# Patient Record
Sex: MALE | Race: WHITE | NOT HISPANIC OR LATINO | Employment: FULL TIME | URBAN - METROPOLITAN AREA
[De-identification: names, ages, dates, MRNs, and addresses within clinical notes are randomized per-mention and may not be internally consistent; named-entity substitution may affect disease eponyms.]

---

## 2017-11-03 ENCOUNTER — OFFICE VISIT (OUTPATIENT)
Dept: RETAIL CLINIC | Facility: CLINIC | Age: 47
End: 2017-11-03

## 2017-11-03 VITALS
OXYGEN SATURATION: 98 % | SYSTOLIC BLOOD PRESSURE: 128 MMHG | TEMPERATURE: 97.1 F | RESPIRATION RATE: 20 BRPM | DIASTOLIC BLOOD PRESSURE: 74 MMHG | HEART RATE: 74 BPM

## 2017-11-03 DIAGNOSIS — H66.003 ACUTE SUPPURATIVE OTITIS MEDIA OF BOTH EARS WITHOUT SPONTANEOUS RUPTURE OF TYMPANIC MEMBRANES, RECURRENCE NOT SPECIFIED: ICD-10-CM

## 2017-11-03 DIAGNOSIS — J01.00 ACUTE MAXILLARY SINUSITIS, RECURRENCE NOT SPECIFIED: Primary | ICD-10-CM

## 2017-11-03 PROCEDURE — 99213 OFFICE O/P EST LOW 20 MIN: CPT | Performed by: NURSE PRACTITIONER

## 2017-11-03 RX ORDER — FEXOFENADINE HCL 180 MG/1
180 TABLET ORAL DAILY
COMMUNITY

## 2017-11-03 RX ORDER — AZITHROMYCIN 250 MG/1
TABLET, FILM COATED ORAL
Qty: 6 TABLET | Refills: 0 | Status: SHIPPED | OUTPATIENT
Start: 2017-11-03 | End: 2018-04-03

## 2017-11-03 RX ORDER — FLUTICASONE PROPIONATE 50 MCG
2 SPRAY, SUSPENSION (ML) NASAL DAILY
Qty: 1 BOTTLE | Refills: 0 | Status: SHIPPED | OUTPATIENT
Start: 2017-11-03 | End: 2017-11-10

## 2017-11-03 NOTE — PROGRESS NOTES
Subjective   Andriy Allison is a 47 y.o. male.     Sinusitis   This is a new problem. The problem is unchanged. There has been no fever. Associated symptoms include ear pain, headaches and sinus pressure. Pertinent negatives include no chills, shortness of breath or sore throat. Treatments tried: sudafed. The treatment provided mild relief.        The following portions of the patient's history were reviewed and updated as appropriate: allergies, current medications, past family history, past medical history, past social history, past surgical history and problem list.    Review of Systems   Constitutional: Negative.  Negative for chills and fever.   HENT: Positive for ear pain and sinus pressure. Negative for sore throat.    Eyes: Negative.    Respiratory: Negative.  Negative for shortness of breath.    Cardiovascular: Negative.    Gastrointestinal: Negative.    Endocrine: Negative.    Genitourinary: Negative.    Musculoskeletal: Negative.    Skin: Negative.    Allergic/Immunologic: Negative.    Neurological: Positive for headaches.   Hematological: Negative.    Psychiatric/Behavioral: Negative.        Objective   Physical Exam   Constitutional: He is oriented to person, place, and time. Vital signs are normal. He appears well-developed and well-nourished.   HENT:   Head: Normocephalic and atraumatic.   Right Ear: Hearing, external ear and ear canal normal. Tympanic membrane is erythematous.   Left Ear: Hearing, external ear and ear canal normal. Tympanic membrane is erythematous.   Nose: Right sinus exhibits maxillary sinus tenderness and frontal sinus tenderness. Left sinus exhibits maxillary sinus tenderness and frontal sinus tenderness.   Mouth/Throat: Uvula is midline and mucous membranes are normal. Posterior oropharyngeal erythema present. No tonsillar exudate.   Eyes: Conjunctivae and lids are normal. Pupils are equal, round, and reactive to light.   Neck: Trachea normal and normal range of motion. Neck  supple.   Cardiovascular: Normal rate, regular rhythm, S1 normal, S2 normal and normal heart sounds.    Pulmonary/Chest: Effort normal and breath sounds normal. No respiratory distress.   Abdominal: Soft. Normal appearance and bowel sounds are normal. There is no tenderness.   Musculoskeletal: Normal range of motion.   Lymphadenopathy:        Head (right side): Posterior auricular adenopathy present.        Head (left side): No posterior auricular adenopathy present.     He has no cervical adenopathy.   Small post auricular lymph node    Neurological: He is alert and oriented to person, place, and time. He has normal strength.   Skin: Skin is warm, dry and intact. No rash noted.   Psychiatric: He has a normal mood and affect. His speech is normal and behavior is normal.   Vitals reviewed.      Assessment/Plan   Problems Addressed this Visit     None      Visit Diagnoses     Acute maxillary sinusitis, recurrence not specified    -  Primary    Acute suppurative otitis media of both ears without spontaneous rupture of tympanic membranes, recurrence not specified            Blood glucose 150 this am  Zpack as directed   Flonase 2 sprays in each nostril once a day  Check blood sugar 2 times a day

## 2018-04-03 ENCOUNTER — OFFICE VISIT (OUTPATIENT)
Dept: RETAIL CLINIC | Facility: CLINIC | Age: 48
End: 2018-04-03

## 2018-04-03 VITALS
DIASTOLIC BLOOD PRESSURE: 84 MMHG | SYSTOLIC BLOOD PRESSURE: 122 MMHG | HEART RATE: 90 BPM | TEMPERATURE: 98.5 F | OXYGEN SATURATION: 97 %

## 2018-04-03 DIAGNOSIS — H66.002 ACUTE SUPPURATIVE OTITIS MEDIA OF LEFT EAR WITHOUT SPONTANEOUS RUPTURE OF TYMPANIC MEMBRANE, RECURRENCE NOT SPECIFIED: Primary | ICD-10-CM

## 2018-04-03 DIAGNOSIS — R05.9 COUGH: ICD-10-CM

## 2018-04-03 PROCEDURE — 99213 OFFICE O/P EST LOW 20 MIN: CPT | Performed by: NURSE PRACTITIONER

## 2018-04-03 RX ORDER — SULFAMETHOXAZOLE AND TRIMETHOPRIM 800; 160 MG/1; MG/1
1 TABLET ORAL 2 TIMES DAILY
Qty: 20 TABLET | Refills: 0 | Status: SHIPPED | OUTPATIENT
Start: 2018-04-03 | End: 2018-04-13

## 2018-04-03 RX ORDER — BENZONATATE 100 MG/1
CAPSULE ORAL
Qty: 30 CAPSULE | Refills: 0 | Status: SHIPPED | OUTPATIENT
Start: 2018-04-03

## 2018-04-03 NOTE — PROGRESS NOTES
Subjective     Andriy Allison is a 48 y.o.. male.     Sinusitis   This is a new problem. Episode onset: 2 days ago. The problem is unchanged. There has been no fever. Associated symptoms include congestion, coughing (productive), ear pain, headaches and a sore throat. Treatments tried: cough med, tylenol, sudafed. The treatment provided mild relief.       The following portions of the patient's history were reviewed and updated as appropriate: allergies, current medications, past family history, past medical history, past social history, past surgical history and problem list.    Review of Systems   Constitutional: Negative for fever.   HENT: Positive for congestion, ear pain, rhinorrhea and sore throat.    Respiratory: Positive for cough (productive).    Gastrointestinal: Negative for abdominal pain, diarrhea, nausea and vomiting.   Neurological: Positive for headaches.       Objective     Vitals:    04/03/18 1638   BP: 122/84   Pulse: 90   Temp: 98.5 °F (36.9 °C)   TempSrc: Oral   SpO2: 97%       Physical Exam   Constitutional: He is oriented to person, place, and time. He appears well-developed and well-nourished.   HENT:   Head: Normocephalic and atraumatic.   Right Ear: Tympanic membrane normal. Tympanic membrane is not erythematous.   Left Ear: Tympanic membrane is erythematous. A middle ear effusion is present.   Nose: Mucosal edema present. Right sinus exhibits no maxillary sinus tenderness and no frontal sinus tenderness. Left sinus exhibits no maxillary sinus tenderness and no frontal sinus tenderness.   Mouth/Throat: Oropharynx is clear and moist. Oropharyngeal exudate: pnd.   Eyes: Conjunctivae are normal. Pupils are equal, round, and reactive to light.   Cardiovascular: Normal rate and regular rhythm.    No murmur heard.  Pulmonary/Chest: Effort normal. He has no wheezes. He has no rhonchi. He has no rales.   Musculoskeletal: Normal range of motion.   Lymphadenopathy:     He has no cervical adenopathy.    Neurological: He is alert and oriented to person, place, and time.   Skin: Skin is warm and dry.   Vitals reviewed.      Assessment/Plan   Andriy was seen today for sinusitis.    Diagnoses and all orders for this visit:    Acute suppurative otitis media of left ear without spontaneous rupture of tympanic membrane, recurrence not specified  -     sulfamethoxazole-trimethoprim (BACTRIM DS) 800-160 MG per tablet; Take 1 tablet by mouth 2 (Two) Times a Day for 10 days.    Cough  -     benzonatate (TESSALON PERLES) 100 MG capsule; 1 cap po 3 times a day as needed for cough        Patient Instructions   Otitis Media, Adult  Otitis media occurs when there is inflammation and fluid in the middle ear. Your middle ear is a part of the ear that contains bones for hearing as well as air that helps send sounds to your brain.  What are the causes?  This condition is caused by a blockage in the eustachian tube. This tube drains fluid from the ear to the back of the nose (nasopharynx). A blockage in this tube can be caused by an object or by swelling (edema) in the tube. Problems that can cause a blockage include:  · A cold or other upper respiratory infection.  · Allergies.  · An irritant, such as tobacco smoke.  · Enlarged adenoids. The adenoids are areas of soft tissue located high in the back of the throat, behind the nose and the roof of the mouth.  · A mass in the nasopharynx.  · Damage to the ear caused by pressure changes (barotrauma).  What are the signs or symptoms?  Symptoms of this condition include:  · Ear pain.  · A fever.  · Decreased hearing.  · A headache.  · Tiredness (lethargy).  · Fluid leaking from the ear.  · Ringing in the ear.  How is this diagnosed?  This condition is diagnosed with a physical exam. During the exam your health care provider will use an instrument called an otoscope to look into your ear and check for redness, swelling, and fluid. He or she will also ask about your symptoms.  Your health  care provider may also order tests, such as:  · A test to check the movement of the eardrum (pneumatic otoscopy). This test is done by squeezing a small amount of air into the ear.  · A test that changes air pressure in the middle ear to check how well the eardrum moves and whether the eustachian tube is working (tympanogram).  How is this treated?  This condition usually goes away on its own within 3-5 days. But if the condition is caused by a bacteria infection and does not go away own its own, or keeps coming back, your health care provider may:  · Prescribe antibiotic medicines to treat the infection.  · Prescribe or recommend medicines to control pain.  Follow these instructions at home:  · Take over-the-counter and prescription medicines only as told by your health care provider.  · If you were prescribed an antibiotic medicine, take it as told by your health care provider. Do not stop taking the antibiotic even if you start to feel better.  · Keep all follow-up visits as told by your health care provider. This is important.  Contact a health care provider if:  · You have bleeding from your nose.  · There is a lump on your neck.  · You are not getting better in 5 days.  · You feel worse instead of better.  Get help right away if:  · You have severe pain that is not controlled with medicine.  · You have swelling, redness, or pain around your ear.  · You have stiffness in your neck.  · A part of your face is paralyzed.  · The bone behind your ear (mastoid) is tender when you touch it.  · You develop a severe headache.  Summary  · Otitis media is redness, soreness, and swelling of the middle ear.  · This condition usually goes away on its own within 3-5 days.  · If the problem does not go away in 3-5 days, your health care provider may prescribe or recommend medicines to treat your symptoms.  · If you were prescribed an antibiotic medicine, take it as told by your health care provider.  This information is not  intended to replace advice given to you by your health care provider. Make sure you discuss any questions you have with your health care provider.  Document Released: 09/22/2005 Document Revised: 12/08/2017 Document Reviewed: 12/08/2017  [employer] Interactive Patient Education © 2017 [employer]        Return if symptoms worsen or fail to improve with urgent care/ER.

## 2018-04-03 NOTE — PATIENT INSTRUCTIONS

## 2018-06-07 ENCOUNTER — OFFICE VISIT (OUTPATIENT)
Dept: GASTROENTEROLOGY | Facility: CLINIC | Age: 48
End: 2018-06-07

## 2018-06-07 VITALS
WEIGHT: 285 LBS | SYSTOLIC BLOOD PRESSURE: 134 MMHG | HEIGHT: 70 IN | TEMPERATURE: 97.9 F | BODY MASS INDEX: 40.8 KG/M2 | DIASTOLIC BLOOD PRESSURE: 82 MMHG

## 2018-06-07 DIAGNOSIS — R93.2 ABNORMAL CT SCAN, LIVER: Primary | ICD-10-CM

## 2018-06-07 PROCEDURE — 99203 OFFICE O/P NEW LOW 30 MIN: CPT | Performed by: INTERNAL MEDICINE

## 2018-06-07 NOTE — PROGRESS NOTES
Chief Complaint   Patient presents with   • Abdominal Pain   • Cirrhosis     Andriy Allison is a 48 y.o. male who presents with a History of left sided chest pain and abnormal CAT scan   HPI     Patient 48-year-old male with history diabetes and GERD as well as hyperlipidemia complaining of left sided pain.  Patient initially felt in the left flank now moved laterally to the left chest wall.  Patient reports pain increased with breathing.  Patient apparently was diagnosed with possible pancreatitis and diabetic medicine was changed.  Patient apparently underwent CT would suggest spleen enlargement and possible changes in the liver for early cirrhosis.  Actual results are not available of these exams but patient did see Dr. Rucker who reported mild enlargement of the spleen with enlargement of the liver that actually increased between February and May from 18 cm to 24 cm.  Liver function tests were all normal as was platelet count.  Patient was to be considered for splenectomy but this was held due to the finding of possible cirrhosis.  Patient here to evaluate liver.  Patient denies family history of liver disease and denies ever being drinker.    Past Medical History:   Diagnosis Date   • Diabetes mellitus    • GERD (gastroesophageal reflux disease)    • Hyperlipidemia    • Pancreatitis    • Prostate disease        Current Outpatient Prescriptions:   •  ALLOPURINOL PO, Take  by mouth., Disp: , Rfl:   •  fexofenadine (ALLEGRA) 180 MG tablet, Take 180 mg by mouth Daily., Disp: , Rfl:   •  insulin detemir (LEVEMIR) 100 UNIT/ML injection, Inject 30 Units under the skin Daily., Disp: , Rfl:   •  Insulin Lispro, Human, (HUMALOG KWIKPEN SC), Inject  under the skin., Disp: , Rfl:   •  Semaglutide 0.25 or 0.5 MG/DOSE solution pen-injector, Inject  under the skin., Disp: , Rfl:   •  tamsulosin (FLOMAX) 0.4 MG capsule 24 hr capsule, Take 1 capsule by mouth every night., Disp: , Rfl:   •  benzonatate (TESSALON PERLES) 100 MG  capsule, 1 cap po 3 times a day as needed for cough, Disp: 30 capsule, Rfl: 0  •  Dulaglutide (TRULICITY) 1.5 MG/0.5ML solution pen-injector, Inject 1.5 mg under the skin 1 (One) Time Per Week., Disp: , Rfl:   •  Lovastatin (MEVACOR PO), Take  by mouth., Disp: , Rfl:   •  omeprazole (PriLOSEC) 40 MG capsule, Take 40 mg by mouth daily., Disp: , Rfl:     Current Facility-Administered Medications:   •  triamcinolone acetonide (KENALOG-40) injection 40 mg, 40 mg, Intramuscular, Once, Vijay Mehta MD  Allergies   Allergen Reactions   • Januvia [Sitagliptin] Other (See Comments)     pancreatitis   • Keflex [Cephalexin] Anaphylaxis   • Penicillins Anaphylaxis     Social History     Social History   • Marital status:      Spouse name: N/A   • Number of children: N/A   • Years of education: N/A     Occupational History   • Not on file.     Social History Main Topics   • Smoking status: Never Smoker   • Smokeless tobacco: Never Used   • Alcohol use No   • Drug use: No   • Sexual activity: Not on file     Other Topics Concern   • Not on file     Social History Narrative   • No narrative on file     Family History   Problem Relation Age of Onset   • Cancer Mother    • Diabetes Mother    • Diverticulitis Mother    • Hypertension Sister    • Hyperlipidemia Sister    • Irritable bowel syndrome Sister    • Diabetes Maternal Grandmother    • Colon cancer Maternal Grandmother    • Hypertension Maternal Grandfather    • Hypertension Paternal Grandmother    • Colon cancer Maternal Uncle      Review of Systems   Constitutional: Negative.    HENT: Negative.    Eyes: Negative.    Respiratory: Negative.    Cardiovascular: Positive for chest pain. Negative for palpitations and leg swelling.   Gastrointestinal: Negative.    Endocrine: Negative.    Musculoskeletal: Negative.    Skin: Negative.    Allergic/Immunologic: Negative.    Hematological: Negative.      Vitals:    06/07/18 0859   BP: 134/82   Temp: 97.9 °F (36.6 °C)      Physical Exam   Constitutional: He is oriented to person, place, and time. He appears well-developed and well-nourished.   HENT:   Head: Normocephalic and atraumatic.   Eyes: EOM are normal. Pupils are equal, round, and reactive to light. No scleral icterus.   Neck: Normal range of motion. No tracheal deviation present.   Cardiovascular: Normal rate and regular rhythm.  Exam reveals no gallop and no friction rub.    No murmur heard.  Pulmonary/Chest: Effort normal and breath sounds normal. No respiratory distress. He exhibits tenderness.   Positive chest wall tenderness over the left rib cage.  She reports palpation reproduces chest pain described   Abdominal: Soft. Bowel sounds are normal. He exhibits no distension and no mass. There is no tenderness. There is no rebound and no guarding.   Musculoskeletal: Normal range of motion. He exhibits no edema.   Neurological: He is alert and oriented to person, place, and time. No cranial nerve deficit.   Skin: Skin is warm and dry. No rash noted.   Psychiatric: He has a normal mood and affect. His behavior is normal.   Nursing note and vitals reviewed.    Diagnoses and all orders for this visit:    Abnormal CT scan, liver  -     Alpha - 1 - Antitrypsin  -     NERI  -     Anti-Smooth Muscle Antibody Titer  -     Comprehensive Metabolic Panel  -     Hepatitis A Antibody, Total  -     Hepatitis B Surface Antibody  -     Hepatitis B Surface Antigen  -     Hepatitis C Antibody  -     Iron Profile  -     Mitochondrial Antibodies, M2  -     NM Liver Spleen Scan; Future  -     Protime-INR  -     CBC (No Diff)    Other orders  -     Semaglutide 0.25 or 0.5 MG/DOSE solution pen-injector; Inject  under the skin.    Patient 48-year-old male with past medical history significant for diabetes, hyperlipidemia and GERD presenting with left upper quadrant pain.  Patient reports concern for possible pancreatitis led to changing his diabetic medicines.  Patient reports tried holding his  food was only eating liquids and protein shakes with no real change in his pain.  Patient now back on food with no change in the pain either.  Patient reports feels like runner stitch in his left rib cage.  Patient reports increased pain with breathing.  Physical exam with tenderness over the left rib cage.  Patient unaware of results of blood testing concerning pancreatitis.  Patient was found because of the left side pain on CT to have enlargement of the spleen as well as what sounds like nodularity of the liver for possible early cirrhosis.  Results specifically are not available but Dr. Rucker mentions enlarged liver from 18 cm to 24 cm between February and May exams.  While enlargement of the liver is not expected and cirrhosis may be seen in fatty liver particularly in the diabetic.  Liver tests that are available do not just cirrhosis.  At this point will send us her logic evaluation for liver disease as well as arrange liver spleen scan to evaluate for colloid shift.  Patient was never an alcohol drinker and doubt that this is actually cirrhosis causing this issue.  Review of normal LFTs and normal platelet count this may be just a consequence of fatty liver bleeding to systems clinic hypertrophy.  We'll follow up results clinically for further recommendations.

## 2018-06-08 LAB
A1AT SERPL-MCNC: 153 MG/DL (ref 90–200)
ACTIN IGG SERPL-ACNC: 12 UNITS (ref 0–19)
ALBUMIN SERPL-MCNC: 4.4 G/DL (ref 3.5–5.2)
ALBUMIN/GLOB SERPL: 1.1 G/DL
ALP SERPL-CCNC: 91 U/L (ref 39–117)
ALT SERPL-CCNC: 25 U/L (ref 1–41)
ANA SER QL: NEGATIVE
AST SERPL-CCNC: 27 U/L (ref 1–40)
BILIRUB SERPL-MCNC: 0.8 MG/DL (ref 0.1–1.2)
BUN SERPL-MCNC: 24 MG/DL (ref 6–20)
BUN/CREAT SERPL: 20 (ref 7–25)
CALCIUM SERPL-MCNC: 9.3 MG/DL (ref 8.6–10.5)
CHLORIDE SERPL-SCNC: 98 MMOL/L (ref 98–107)
CO2 SERPL-SCNC: 23.2 MMOL/L (ref 22–29)
CREAT SERPL-MCNC: 1.2 MG/DL (ref 0.76–1.27)
ERYTHROCYTE [DISTWIDTH] IN BLOOD BY AUTOMATED COUNT: 13.3 % (ref 11.5–14.5)
GFR SERPLBLD CREATININE-BSD FMLA CKD-EPI: 65 ML/MIN/1.73
GFR SERPLBLD CREATININE-BSD FMLA CKD-EPI: 79 ML/MIN/1.73
GLOBULIN SER CALC-MCNC: 4 GM/DL
GLUCOSE SERPL-MCNC: 183 MG/DL (ref 65–99)
HAV AB SER QL IA: NEGATIVE
HBV SURFACE AB SER QL: NON REACTIVE
HBV SURFACE AG SERPL QL IA: NEGATIVE
HCT VFR BLD AUTO: 36.5 % (ref 40.4–52.2)
HCV AB S/CO SERPL IA: 0.1 S/CO RATIO (ref 0–0.9)
HGB BLD-MCNC: 13.2 G/DL (ref 13.7–17.6)
INR PPP: 1.11 (ref 0.9–1.1)
IRON SATN MFR SERPL: 26 % (ref 20–50)
IRON SERPL-MCNC: 90 MCG/DL (ref 59–158)
MCH RBC QN AUTO: 31.7 PG (ref 27–32.7)
MCHC RBC AUTO-ENTMCNC: 36.2 G/DL (ref 32.6–36.4)
MCV RBC AUTO: 87.7 FL (ref 79.8–96.2)
MITOCHONDRIA M2 IGG SER-ACNC: <20 UNITS (ref 0–20)
PLATELET # BLD AUTO: 146 10*3/MM3 (ref 140–500)
POTASSIUM SERPL-SCNC: 4.1 MMOL/L (ref 3.5–5.2)
PROT SERPL-MCNC: 8.4 G/DL (ref 6–8.5)
PROTHROMBIN TIME: 14.1 SECONDS (ref 11.7–14.2)
RBC # BLD AUTO: 4.16 10*6/MM3 (ref 4.6–6)
SODIUM SERPL-SCNC: 135 MMOL/L (ref 136–145)
TIBC SERPL-MCNC: 351 MCG/DL
UIBC SERPL-MCNC: 261 MCG/DL
WBC # BLD AUTO: 7.28 10*3/MM3 (ref 4.5–10.7)

## 2018-06-13 ENCOUNTER — HOSPITAL ENCOUNTER (OUTPATIENT)
Dept: NUCLEAR MEDICINE | Facility: HOSPITAL | Age: 48
Discharge: HOME | End: 2018-06-13
Attending: INTERNAL MEDICINE

## 2018-06-13 DIAGNOSIS — R93.2 ABNORMAL CT SCAN, LIVER: ICD-10-CM

## 2018-06-13 PROCEDURE — 0 TECHNETIUM SULFUR COLLOID: Performed by: INTERNAL MEDICINE

## 2018-06-13 PROCEDURE — 78215 LVR&SPLEEN IMG STATIC ONLY: CPT

## 2018-06-13 PROCEDURE — A9541 TC99M SULFUR COLLOID: HCPCS | Performed by: INTERNAL MEDICINE

## 2018-06-13 RX ADMIN — TECHNETIUM TC 99M SULFUR COLLOID 1 DOSE: KIT at 09:54

## 2018-06-22 ENCOUNTER — TELEPHONE (OUTPATIENT)
Dept: GASTROENTEROLOGY | Facility: CLINIC | Age: 48
End: 2018-06-22

## 2018-07-24 ENCOUNTER — TELEPHONE (OUTPATIENT)
Dept: GASTROENTEROLOGY | Facility: CLINIC | Age: 48
End: 2018-07-24

## 2018-07-24 NOTE — TELEPHONE ENCOUNTER
----- Message from Samson Romo MD sent at 7/9/2018  4:28 PM EDT -----  LAbs all normal with normal liver spleen scan, no evidence for liver disease or cirrhosis

## 2018-07-24 NOTE — TELEPHONE ENCOUNTER
----- Message from Samson Romo MD sent at 7/6/2018  3:00 PM EDT -----  Skin the labs all normal consistent with normal liver function.  No evidence of liver dysfunction present.  Patient follow-up as needed.

## 2024-12-09 ENCOUNTER — HOSPITAL ENCOUNTER (EMERGENCY)
Facility: HOSPITAL | Age: 54
Discharge: HOME | End: 2024-12-09
Attending: EMERGENCY MEDICINE | Admitting: EMERGENCY MEDICINE
Payer: COMMERCIAL

## 2024-12-09 ENCOUNTER — APPOINTMENT (OUTPATIENT)
Dept: GENERAL RADIOLOGY | Facility: HOSPITAL | Age: 54
End: 2024-12-09
Payer: COMMERCIAL

## 2024-12-09 ENCOUNTER — APPOINTMENT (OUTPATIENT)
Dept: CT IMAGING | Facility: HOSPITAL | Age: 54
End: 2024-12-09
Payer: COMMERCIAL

## 2024-12-09 VITALS
TEMPERATURE: 97.9 F | DIASTOLIC BLOOD PRESSURE: 67 MMHG | SYSTOLIC BLOOD PRESSURE: 113 MMHG | RESPIRATION RATE: 20 BRPM | OXYGEN SATURATION: 96 % | HEIGHT: 70 IN | WEIGHT: 300 LBS | BODY MASS INDEX: 42.95 KG/M2 | HEART RATE: 80 BPM

## 2024-12-09 DIAGNOSIS — R07.9 CHEST PAIN, UNSPECIFIED TYPE: Primary | ICD-10-CM

## 2024-12-09 LAB
ALBUMIN SERPL-MCNC: 3.8 G/DL (ref 3.5–5.2)
ALBUMIN/GLOB SERPL: 1 G/DL
ALP SERPL-CCNC: 96 U/L (ref 39–117)
ALT SERPL W P-5'-P-CCNC: 23 U/L (ref 1–41)
ANION GAP SERPL CALCULATED.3IONS-SCNC: 7 MMOL/L (ref 5–15)
ANISOCYTOSIS BLD QL: ABNORMAL
AST SERPL-CCNC: 37 U/L (ref 1–40)
BASOPHILS # BLD MANUAL: 0 10*3/MM3 (ref 0–0.2)
BASOPHILS NFR BLD MANUAL: 0 % (ref 0–1.5)
BILIRUB SERPL-MCNC: 1.5 MG/DL (ref 0–1.2)
BUN SERPL-MCNC: 10 MG/DL (ref 6–20)
BUN/CREAT SERPL: 8.2 (ref 7–25)
CALCIUM SPEC-SCNC: 9 MG/DL (ref 8.6–10.5)
CHLORIDE SERPL-SCNC: 104 MMOL/L (ref 98–107)
CO2 SERPL-SCNC: 24 MMOL/L (ref 22–29)
CREAT SERPL-MCNC: 1.22 MG/DL (ref 0.76–1.27)
D DIMER PPP FEU-MCNC: 1.58 MCGFEU/ML (ref 0–0.52)
DEPRECATED RDW RBC AUTO: 44.3 FL (ref 37–54)
EGFRCR SERPLBLD CKD-EPI 2021: 71.3 ML/MIN/1.73
EOSINOPHIL # BLD MANUAL: 0.15 10*3/MM3 (ref 0–0.4)
EOSINOPHIL NFR BLD MANUAL: 3.1 % (ref 0.3–6.2)
ERYTHROCYTE [DISTWIDTH] IN BLOOD BY AUTOMATED COUNT: 13.7 % (ref 12.3–15.4)
GEN 5 1HR TROPONIN T REFLEX: <6 NG/L
GLOBULIN UR ELPH-MCNC: 4 GM/DL
GLUCOSE SERPL-MCNC: 171 MG/DL (ref 65–99)
HCT VFR BLD AUTO: 42.1 % (ref 37.5–51)
HGB BLD-MCNC: 14.3 G/DL (ref 13–17.7)
LYMPHOCYTES # BLD MANUAL: 0.93 10*3/MM3 (ref 0.7–3.1)
LYMPHOCYTES NFR BLD MANUAL: 5.2 % (ref 5–12)
MCH RBC QN AUTO: 30 PG (ref 26.6–33)
MCHC RBC AUTO-ENTMCNC: 34 G/DL (ref 31.5–35.7)
MCV RBC AUTO: 88.3 FL (ref 79–97)
MICROCYTES BLD QL: ABNORMAL
MONOCYTES # BLD: 0.25 10*3/MM3 (ref 0.1–0.9)
NEUTROPHILS # BLD AUTO: 3.44 10*3/MM3 (ref 1.7–7)
NEUTROPHILS NFR BLD MANUAL: 72.2 % (ref 42.7–76)
OVALOCYTES BLD QL SMEAR: ABNORMAL
PLAT MORPH BLD: NORMAL
PLATELET # BLD AUTO: 88 10*3/MM3 (ref 140–450)
PMV BLD AUTO: 10 FL (ref 6–12)
POTASSIUM SERPL-SCNC: 3.9 MMOL/L (ref 3.5–5.2)
PROT SERPL-MCNC: 7.8 G/DL (ref 6–8.5)
RBC # BLD AUTO: 4.77 10*6/MM3 (ref 4.14–5.8)
SODIUM SERPL-SCNC: 135 MMOL/L (ref 136–145)
TROPONIN T NUMERIC DELTA: NORMAL
TROPONIN T SERPL HS-MCNC: <6 NG/L
VARIANT LYMPHS NFR BLD MANUAL: 17.5 % (ref 19.6–45.3)
VARIANT LYMPHS NFR BLD MANUAL: 2.1 % (ref 0–5)
WBC MORPH BLD: NORMAL
WBC NRBC COR # BLD AUTO: 4.65 10*3/MM3 (ref 3.4–10.8)

## 2024-12-09 PROCEDURE — 36415 COLL VENOUS BLD VENIPUNCTURE: CPT

## 2024-12-09 PROCEDURE — 99285 EMERGENCY DEPT VISIT HI MDM: CPT

## 2024-12-09 PROCEDURE — 85007 BL SMEAR W/DIFF WBC COUNT: CPT | Performed by: EMERGENCY MEDICINE

## 2024-12-09 PROCEDURE — 93010 ELECTROCARDIOGRAM REPORT: CPT | Performed by: INTERNAL MEDICINE

## 2024-12-09 PROCEDURE — 25510000001 IOPAMIDOL PER 1 ML: Performed by: EMERGENCY MEDICINE

## 2024-12-09 PROCEDURE — G0463 HOSPITAL OUTPT CLINIC VISIT: HCPCS | Performed by: EMERGENCY MEDICINE

## 2024-12-09 PROCEDURE — 85379 FIBRIN DEGRADATION QUANT: CPT | Performed by: EMERGENCY MEDICINE

## 2024-12-09 PROCEDURE — 80053 COMPREHEN METABOLIC PANEL: CPT | Performed by: EMERGENCY MEDICINE

## 2024-12-09 PROCEDURE — 93005 ELECTROCARDIOGRAM TRACING: CPT | Performed by: EMERGENCY MEDICINE

## 2024-12-09 PROCEDURE — 84484 ASSAY OF TROPONIN QUANT: CPT | Performed by: EMERGENCY MEDICINE

## 2024-12-09 PROCEDURE — 85025 COMPLETE CBC W/AUTO DIFF WBC: CPT | Performed by: EMERGENCY MEDICINE

## 2024-12-09 PROCEDURE — 99283 EMERGENCY DEPT VISIT LOW MDM: CPT | Performed by: EMERGENCY MEDICINE

## 2024-12-09 PROCEDURE — 71275 CT ANGIOGRAPHY CHEST: CPT

## 2024-12-09 PROCEDURE — 71046 X-RAY EXAM CHEST 2 VIEWS: CPT

## 2024-12-09 RX ORDER — ASPIRIN 325 MG
325 TABLET ORAL ONCE
Status: COMPLETED | OUTPATIENT
Start: 2024-12-09 | End: 2024-12-09

## 2024-12-09 RX ORDER — SODIUM CHLORIDE 0.9 % (FLUSH) 0.9 %
10 SYRINGE (ML) INJECTION AS NEEDED
Status: DISCONTINUED | OUTPATIENT
Start: 2024-12-09 | End: 2024-12-09 | Stop reason: HOSPADM

## 2024-12-09 RX ORDER — IOPAMIDOL 755 MG/ML
100 INJECTION, SOLUTION INTRAVASCULAR
Status: COMPLETED | OUTPATIENT
Start: 2024-12-09 | End: 2024-12-09

## 2024-12-09 RX ADMIN — ASPIRIN 325 MG: 325 TABLET ORAL at 10:54

## 2024-12-09 RX ADMIN — IOPAMIDOL 85 ML: 755 INJECTION, SOLUTION INTRAVENOUS at 12:19

## 2024-12-09 NOTE — DISCHARGE INSTRUCTIONS
Your EKG and your troponin for heart attack were negative for heart attack today. Your blood test for blood clot, Ddimer, was elevated and clots in the veins of the lungs can cause chest pain and shoulder/arm pain. The CT was done and it was negative for lung blood clots. The rest of the blood work up was not concerning. It may very well be related to changing the 50 pound tire, not something you do every day. I encourage you to get a CT calcium cardiac test done to evaluate your coronary arteries especially due to your past medical history and your family history. Your PCP can set this up.

## 2024-12-09 NOTE — FSED PROVIDER NOTE
Subjective   History of Present Illness  53 yo male having intermittent central chest pain on and off after changing a 50 pound tire. Within the past two days he has had pain of his left upper shoulder but not from radiating from his chest. No shoulder pain or arm pain with movement. He is right handed. No nausea or vomiting, no fever or cough. No back pain. FH significant several family members (father and uncle with MI). 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  When he arrived he had no chest pain, but continues to have left shoulder pain without known injury.     Chest pain started after patient stated he was changing a difficult tire, thought he pulled something in his chest. Arm pain started after and today had intermittent dizziness. Denies any cardiac hx in the past.      Review of Systems    Past Medical History:   Diagnosis Date    Diabetes mellitus     GERD (gastroesophageal reflux disease)     Hyperlipidemia     Pancreatitis     Prostate disease        Allergies   Allergen Reactions    Januvia [Sitagliptin] Other (See Comments)     pancreatitis    Keflex [Cephalexin] Anaphylaxis    Penicillins Anaphylaxis    Doxycycline Unknown - High Severity       Past Surgical History:   Procedure Laterality Date    KIDNEY STONE SURGERY  2012    SINUS SURGERY  12/2014    WISDOM TOOTH EXTRACTION         Family History   Problem Relation Age of Onset    Cancer Mother     Diabetes Mother     Diverticulitis Mother     Hypertension Sister     Hyperlipidemia Sister     Irritable bowel syndrome Sister     Diabetes Maternal Grandmother     Colon cancer Maternal Grandmother     Hypertension Maternal Grandfather     Hypertension Paternal Grandmother     Colon cancer Maternal Uncle        Social History     Socioeconomic History    Marital status:    Tobacco Use    Smoking status: Never    Smokeless tobacco: Never   Substance and Sexual Activity     Alcohol use: No    Drug use: No           Objective   Physical Exam    Procedures           ED Course  ED Course as of 12/09/24 1329   Mon Dec 09, 2024   1120 EKG normal sinus rhythm rate 73 incomplete right bundle branch and LAFB wave progression is bit late and abnormal no evidence ST elevations.  No STEMI [AR]   1121 DBC with differential only shows on the CBC platelets are increased from 1 40-88 we will check on this as a past trend.  CMP shows sugar 171 sodium a little bit low but not by much at 135 total bilirubin is 1.5 which goes with his history of nonalcoholic fatty liver. [AR]   1122 Rads read final result chest x-ray no pneumonia no mass no pleural effusions no pneumothorax normal mediastinum but there is some scarring near the heart. [AR]   1123 June 2024 the platelets were 90 this may be his baseline. [AR]   1148 Troponin is less than 6 unlikely MI but his D-dimer is up to 1.58 so we will have to get a CTA for PE  [AR]   1320 CTA chest  IMPRESSION:     1. The study is negative for pulmonary embolism.  2. Low lung volumes with bilateral perihilar and bilateral dependent  groundglass opacities in both lungs that could represent atelectasis or  could be infectious/inflammatory.  3. Partially imaged cirrhotic liver with evidence of portal venous  hypertension including venous varicosities, recanalization of the  periumbilical vein, and partially imaged splenomegaly.      [AR]      ED Course User Index  [AR] Suni Rodriguez MD                HEART Score: 3                            Medical Decision Making  Differential for chest pain includes, but not limited to: MI, asthma, COPD, PE, pneumothorax, hemopneumothorax, aortic dissection, pericardial tamponade pneumonia, pericarditis, pleuritic chest pain, esophagitis, esophageal varices-rupture, mediastinitis, pleural effusion, pulmonary fluid overload, CHF, acute allergic reaction, carbon monoxide poisoning, pulmonary hypertension, and pulmonary fibrosis.      Your EKG and your troponin for heart attack were negative for heart attack today. Your blood test for blood clot, Ddimer, was elevated and clots in the veins of the lungs can cause chest pain and shoulder/arm pain. The CT was done and it was negative for lung blood clots. The rest of the blood work up was not concerning. It may very well be related to changing the 50 pound tire, not something you do every day. I encourage you to get a CT calcium cardiac test done to evaluate your coronary arteries especially due to your past medical history and your family history. Your PCP can set this up.    He and his wife understood and agreed.    Amount and/or Complexity of Data Reviewed  Labs: ordered.  Radiology: ordered.  ECG/medicine tests: ordered.    Risk  OTC drugs.  Prescription drug management.        Final diagnoses:   Chest pain, unspecified type       ED Disposition  ED Disposition       ED Disposition   Discharge    Condition   Stable    Comment   --               Jyothi Salomon, APRN  [address] [id number] [phone]      This week         Medication List      No changes were made to your prescriptions during this visit.

## 2024-12-10 LAB
QT INTERVAL: 412 MS
QTC INTERVAL: 454 MS

## 2025-07-23 ENCOUNTER — HOSPITAL ENCOUNTER (EMERGENCY)
Facility: HOSPITAL | Age: 55
Discharge: ANOTHER HOSPITAL | End: 2025-07-24
Attending: STUDENT IN AN ORGANIZED HEALTH CARE EDUCATION/TRAINING PROGRAM | Admitting: EMERGENCY MEDICINE
Payer: COMMERCIAL

## 2025-07-23 ENCOUNTER — APPOINTMENT (OUTPATIENT)
Dept: CT IMAGING | Facility: HOSPITAL | Age: 55
End: 2025-07-23
Payer: COMMERCIAL

## 2025-07-23 ENCOUNTER — APPOINTMENT (OUTPATIENT)
Dept: GENERAL RADIOLOGY | Facility: HOSPITAL | Age: 55
End: 2025-07-23
Payer: COMMERCIAL

## 2025-07-23 DIAGNOSIS — R06.00 DYSPNEA, UNSPECIFIED TYPE: Primary | ICD-10-CM

## 2025-07-23 DIAGNOSIS — I85.00 ESOPHAGEAL VARICES WITHOUT BLEEDING, UNSPECIFIED ESOPHAGEAL VARICES TYPE: ICD-10-CM

## 2025-07-23 DIAGNOSIS — R16.1 SPLEEN ENLARGED: ICD-10-CM

## 2025-07-23 LAB
ALBUMIN SERPL-MCNC: 3.7 G/DL (ref 3.5–5.2)
ALBUMIN/GLOB SERPL: 1 G/DL
ALP SERPL-CCNC: 86 U/L (ref 39–117)
ALT SERPL W P-5'-P-CCNC: 13 U/L (ref 1–41)
ANION GAP SERPL CALCULATED.3IONS-SCNC: 10.9 MMOL/L (ref 5–15)
AST SERPL-CCNC: 32 U/L (ref 1–40)
BACTERIA UR QL AUTO: NORMAL /HPF
BASOPHILS # BLD AUTO: 0.02 10*3/MM3 (ref 0–0.2)
BASOPHILS NFR BLD AUTO: 0.5 % (ref 0–1.5)
BILIRUB SERPL-MCNC: 1.5 MG/DL (ref 0–1.2)
BILIRUB UR QL STRIP: NEGATIVE
BUN SERPL-MCNC: 15.3 MG/DL (ref 6–20)
BUN/CREAT SERPL: 12.5 (ref 7–25)
CALCIUM SPEC-SCNC: 9.7 MG/DL (ref 8.6–10.5)
CHLORIDE SERPL-SCNC: 102 MMOL/L (ref 98–107)
CLARITY UR: CLEAR
CO2 SERPL-SCNC: 23.1 MMOL/L (ref 22–29)
COLOR UR: YELLOW
CREAT SERPL-MCNC: 1.22 MG/DL (ref 0.76–1.27)
D-LACTATE SERPL-SCNC: 1.4 MMOL/L (ref 0.5–2)
DEPRECATED RDW RBC AUTO: 47.5 FL (ref 37–54)
EGFRCR SERPLBLD CKD-EPI 2021: 70.9 ML/MIN/1.73
EOSINOPHIL # BLD AUTO: 0.08 10*3/MM3 (ref 0–0.4)
EOSINOPHIL NFR BLD AUTO: 2.2 % (ref 0.3–6.2)
ERYTHROCYTE [DISTWIDTH] IN BLOOD BY AUTOMATED COUNT: 14.4 % (ref 12.3–15.4)
GLOBULIN UR ELPH-MCNC: 3.7 GM/DL
GLUCOSE SERPL-MCNC: 114 MG/DL (ref 65–99)
GLUCOSE UR STRIP-MCNC: ABNORMAL MG/DL
HCT VFR BLD AUTO: 39.4 % (ref 37.5–51)
HGB BLD-MCNC: 13.2 G/DL (ref 13–17.7)
HGB UR QL STRIP.AUTO: ABNORMAL
HOLD SPECIMEN: NORMAL
HOLD SPECIMEN: NORMAL
HYALINE CASTS UR QL AUTO: NORMAL /LPF
IMM GRANULOCYTES # BLD AUTO: 0 10*3/MM3 (ref 0–0.05)
IMM GRANULOCYTES NFR BLD AUTO: 0 % (ref 0–0.5)
KETONES UR QL STRIP: NEGATIVE
LEUKOCYTE ESTERASE UR QL STRIP.AUTO: NEGATIVE
LIPASE SERPL-CCNC: 65 U/L (ref 13–60)
LYMPHOCYTES # BLD AUTO: 0.96 10*3/MM3 (ref 0.7–3.1)
LYMPHOCYTES NFR BLD AUTO: 26.1 % (ref 19.6–45.3)
MCH RBC QN AUTO: 29.7 PG (ref 26.6–33)
MCHC RBC AUTO-ENTMCNC: 33.5 G/DL (ref 31.5–35.7)
MCV RBC AUTO: 88.5 FL (ref 79–97)
MONOCYTES # BLD AUTO: 0.34 10*3/MM3 (ref 0.1–0.9)
MONOCYTES NFR BLD AUTO: 9.2 % (ref 5–12)
NEUTROPHILS NFR BLD AUTO: 2.28 10*3/MM3 (ref 1.7–7)
NEUTROPHILS NFR BLD AUTO: 62 % (ref 42.7–76)
NITRITE UR QL STRIP: NEGATIVE
PH UR STRIP.AUTO: 6 [PH] (ref 5–8)
PLATELET # BLD AUTO: 80 10*3/MM3 (ref 140–450)
PMV BLD AUTO: 10 FL (ref 6–12)
POTASSIUM SERPL-SCNC: 3.8 MMOL/L (ref 3.5–5.2)
PROT SERPL-MCNC: 7.4 G/DL (ref 6–8.5)
PROT UR QL STRIP: NEGATIVE
RBC # BLD AUTO: 4.45 10*6/MM3 (ref 4.14–5.8)
RBC # UR STRIP: NORMAL /HPF
REF LAB TEST METHOD: NORMAL
SODIUM SERPL-SCNC: 136 MMOL/L (ref 136–145)
SP GR UR STRIP: 1.01 (ref 1–1.03)
SQUAMOUS #/AREA URNS HPF: NORMAL /HPF
UROBILINOGEN UR QL STRIP: ABNORMAL
WBC # UR STRIP: NORMAL /HPF
WBC NRBC COR # BLD AUTO: 3.68 10*3/MM3 (ref 3.4–10.8)
WHOLE BLOOD HOLD COAG: NORMAL
WHOLE BLOOD HOLD SPECIMEN: NORMAL

## 2025-07-23 PROCEDURE — 85025 COMPLETE CBC W/AUTO DIFF WBC: CPT | Performed by: STUDENT IN AN ORGANIZED HEALTH CARE EDUCATION/TRAINING PROGRAM

## 2025-07-23 PROCEDURE — 25510000001 IOPAMIDOL PER 1 ML: Performed by: STUDENT IN AN ORGANIZED HEALTH CARE EDUCATION/TRAINING PROGRAM

## 2025-07-23 PROCEDURE — 80053 COMPREHEN METABOLIC PANEL: CPT | Performed by: STUDENT IN AN ORGANIZED HEALTH CARE EDUCATION/TRAINING PROGRAM

## 2025-07-23 PROCEDURE — 96374 THER/PROPH/DIAG INJ IV PUSH: CPT

## 2025-07-23 PROCEDURE — 83605 ASSAY OF LACTIC ACID: CPT | Performed by: STUDENT IN AN ORGANIZED HEALTH CARE EDUCATION/TRAINING PROGRAM

## 2025-07-23 PROCEDURE — 25810000003 SODIUM CHLORIDE 0.9 % SOLUTION: Performed by: NURSE PRACTITIONER

## 2025-07-23 PROCEDURE — 81001 URINALYSIS AUTO W/SCOPE: CPT | Performed by: STUDENT IN AN ORGANIZED HEALTH CARE EDUCATION/TRAINING PROGRAM

## 2025-07-23 PROCEDURE — 25010000002 ONDANSETRON PER 1 MG: Performed by: NURSE PRACTITIONER

## 2025-07-23 PROCEDURE — 96361 HYDRATE IV INFUSION ADD-ON: CPT

## 2025-07-23 PROCEDURE — 99285 EMERGENCY DEPT VISIT HI MDM: CPT

## 2025-07-23 PROCEDURE — 99285 EMERGENCY DEPT VISIT HI MDM: CPT | Performed by: NURSE PRACTITIONER

## 2025-07-23 PROCEDURE — 83690 ASSAY OF LIPASE: CPT | Performed by: STUDENT IN AN ORGANIZED HEALTH CARE EDUCATION/TRAINING PROGRAM

## 2025-07-23 PROCEDURE — 71046 X-RAY EXAM CHEST 2 VIEWS: CPT

## 2025-07-23 PROCEDURE — 74177 CT ABD & PELVIS W/CONTRAST: CPT

## 2025-07-23 RX ORDER — ONDANSETRON 2 MG/ML
4 INJECTION INTRAMUSCULAR; INTRAVENOUS ONCE
Status: COMPLETED | OUTPATIENT
Start: 2025-07-23 | End: 2025-07-23

## 2025-07-23 RX ORDER — IOPAMIDOL 755 MG/ML
100 INJECTION, SOLUTION INTRAVASCULAR
Status: COMPLETED | OUTPATIENT
Start: 2025-07-23 | End: 2025-07-23

## 2025-07-23 RX ORDER — SODIUM CHLORIDE 0.9 % (FLUSH) 0.9 %
10 SYRINGE (ML) INJECTION AS NEEDED
Status: DISCONTINUED | OUTPATIENT
Start: 2025-07-23 | End: 2025-07-24 | Stop reason: HOSPADM

## 2025-07-23 RX ADMIN — ONDANSETRON 4 MG: 2 INJECTION, SOLUTION INTRAMUSCULAR; INTRAVENOUS at 20:40

## 2025-07-23 RX ADMIN — IOPAMIDOL 85 ML: 755 INJECTION, SOLUTION INTRAVENOUS at 20:37

## 2025-07-23 RX ADMIN — SODIUM CHLORIDE 1000 ML: 9 INJECTION, SOLUTION INTRAVENOUS at 20:40

## 2025-07-24 VITALS
HEIGHT: 70 IN | TEMPERATURE: 97.7 F | BODY MASS INDEX: 37.22 KG/M2 | RESPIRATION RATE: 14 BRPM | OXYGEN SATURATION: 92 % | DIASTOLIC BLOOD PRESSURE: 73 MMHG | WEIGHT: 260 LBS | HEART RATE: 81 BPM | SYSTOLIC BLOOD PRESSURE: 123 MMHG

## 2025-07-24 LAB — HOLD SPECIMEN: NORMAL

## 2025-07-24 NOTE — FSED PROVIDER NOTE
Subjective   History of Present Illness  55 yr old male presents wit Left side pain.  Started on Monday and was better on Tuesday.  Then today (Wednesday) started again and getting worse.  He has had a headache and feels SOA.  Denies N/V.  No blurred vision.  He has a history of Lever cirrhosis, esophageal varies, and he has an enlarged spleen.   He is a patient of [facility].  He was diagnosed in 2020 with GARZA.        Review of Systems   Constitutional: Negative.    Eyes: Negative.    Respiratory:  Positive for shortness of breath.    Gastrointestinal:  Positive for abdominal pain. Negative for constipation, diarrhea, nausea and vomiting.   Genitourinary: Negative.    Neurological:  Positive for headaches.       Past Medical History:   Diagnosis Date    Cirrhosis     Diabetes mellitus     GERD (gastroesophageal reflux disease)     Hyperlipidemia     Pancreatitis     Prostate disease        Allergies   Allergen Reactions    Januvia [Sitagliptin] Other (See Comments)     pancreatitis    Keflex [Cephalexin] Anaphylaxis    Penicillins Anaphylaxis    Doxycycline Unknown - High Severity       Past Surgical History:   Procedure Laterality Date    KIDNEY STONE SURGERY  2012    SINUS SURGERY  12/2014    WISDOM TOOTH EXTRACTION         Family History   Problem Relation Age of Onset    Cancer Mother     Diabetes Mother     Diverticulitis Mother     Hypertension Sister     Hyperlipidemia Sister     Irritable bowel syndrome Sister     Diabetes Maternal Grandmother     Colon cancer Maternal Grandmother     Hypertension Maternal Grandfather     Hypertension Paternal Grandmother     Colon cancer Maternal Uncle        Social History     Socioeconomic History    Marital status:    Tobacco Use    Smoking status: Never    Smokeless tobacco: Never   Substance and Sexual Activity    Alcohol use: No    Drug use: No           Objective   Physical Exam  Vitals and nursing note reviewed.   Constitutional:       Appearance: He is  ill-appearing.   Cardiovascular:      Rate and Rhythm: Normal rate and regular rhythm.      Pulses: Normal pulses.      Heart sounds: Normal heart sounds, S1 normal and S2 normal.   Pulmonary:      Breath sounds: Examination of the right-middle field reveals decreased breath sounds. Examination of the left-middle field reveals decreased breath sounds. Examination of the right-lower field reveals decreased breath sounds. Examination of the left-lower field reveals decreased breath sounds. Decreased breath sounds present.   Abdominal:      General: Abdomen is protuberant. Bowel sounds are normal.      Tenderness: There is abdominal tenderness in the left upper quadrant. There is no right CVA tenderness or left CVA tenderness.      Comments: ABD is slightly firm and it is distended.  He states this is normal for him   Musculoskeletal:      Right lower le+ Edema present.      Left lower le+ Edema present.   Lymphadenopathy:      Head:      Right side of head: No submental, submandibular, tonsillar, preauricular, posterior auricular or occipital adenopathy.      Left side of head: No submental, submandibular, tonsillar, preauricular, posterior auricular or occipital adenopathy.      Cervical: No cervical adenopathy.   Skin:     General: Skin is warm and dry.      Capillary Refill: Capillary refill takes less than 2 seconds.   Neurological:      Mental Status: He is alert.         Procedures           ED Course  ED Course as of 25 Total Bilirubin(!): 1.5 [JJ]    Platelets(!): 80 [JJ]    Lipase(!): 65 [JJ]   192 Lactate: 1.4 [JJ]    Urinalysis, Microscopic Only - Urine, Clean Catch [JJ]    CT Abdomen Pelvis With Contrast  Per Radiology - Cirrhosis with evidence of portal hypertension. No obvious acute findings identified within the abdomen or pelvis.    Nonocclusive thrombus noted within the main portal vein.   [JJ]    Discussed with patient calling [facility]  for possible admission.  Pt does not want to do that.  Discussed with Dr De La Fuente and reviewed test and he recommends admitting.  Pt then agreed.   [JJ]   2200 Called [facility] Transfer center and waiting for on-call. [JJ]   2346 CT Abdomen Pelvis With Contrast [JJ]   2357 Talked to Hospitalist PA at [facility]. Dr Rooney accepting patient. [JJ]      ED Course User Index  [JJ] Amber Mehta, APRN                                           Medical Decision Making  Amount and/or Complexity of Data Reviewed  Labs: ordered. Decision-making details documented in ED Course.  Radiology: ordered.    Risk  Prescription drug management.        Final diagnoses:   Dyspnea, unspecified type   Spleen enlarged   Esophageal varices without bleeding, unspecified esophageal varices type       ED Disposition  ED Disposition       ED Disposition   Transfer to Another Facility     Condition   --    Comment   --               No follow-up provider specified.       Medication List      No changes were made to your prescriptions during this visit.

## 2025-07-24 NOTE — ED NOTES
Nursing report ED to floor  Andriy Allison  55 y.o.  male    HPI :  HPI  Stated Reason for Visit: abd pain  History Obtained From: patient    Chief Complaint  Chief Complaint   Patient presents with    Abdominal Pain     Patient reports left sided pain between the ribs and abd patient reports cirrhosis and enlarged spleen        Admitting doctor:   No admitting provider for patient encounter.    Admitting diagnosis:   The primary encounter diagnosis was Dyspnea, unspecified type. Diagnoses of Spleen enlarged and Esophageal varices without bleeding, unspecified esophageal varices type were also pertinent to this visit.    Code status:   Current Code Status       Date Active Code Status Order ID Comments User Context       Not on file            Allergies:   Januvia [sitagliptin], Keflex [cephalexin], Penicillins, and Doxycycline    Isolation:   No active isolations    Intake and Output  No intake or output data in the 24 hours ending 07/24/25 0347    Weight:       07/23/25  1825   Weight: 118 kg (260 lb)       Most recent vitals:   Vitals:    07/23/25 2330 07/24/25 0000 07/24/25 0001 07/24/25 0129   BP:  114/71 114/71 111/74   Pulse: 75 75  76   Resp:       Temp:       TempSrc:       SpO2: 94% 95%  96%   Weight:       Height:           Active LDAs/IV Access:   Lines, Drains & Airways       Active LDAs       Name Placement date Placement time Site Days    Peripheral IV 07/23/25 18 G Anterior;Right Forearm 07/23/25  --  Forearm  1                    Labs (abnormal labs have a star):   Labs Reviewed   COMPREHENSIVE METABOLIC PANEL - Abnormal; Notable for the following components:       Result Value    Glucose 114 (*)     Total Bilirubin 1.5 (*)     All other components within normal limits    Narrative:     GFR Categories in Chronic Kidney Disease (CKD)              GFR Category          GFR (mL/min/1.73)    Interpretation  G1                    90 or greater        Normal or high (1)  G2                    60-89                 Mild decrease (1)  G3a                   45-59                Mild to moderate decrease  G3b                   30-44                Moderate to severe decrease  G4                    15-29                Severe decrease  G5                    14 or less           Kidney failure    (1)In the absence of evidence of kidney disease, neither GFR category G1 or G2 fulfill the criteria for CKD.    eGFR calculation 2021 CKD-EPI creatinine equation, which does not include race as a factor   LIPASE - Abnormal; Notable for the following components:    Lipase 65 (*)     All other components within normal limits   URINALYSIS W/ MICROSCOPIC IF INDICATED (NO CULTURE) - Abnormal; Notable for the following components:    Glucose,  mg/dL (2+) (*)     Blood, UA Trace (*)     All other components within normal limits   CBC WITH AUTO DIFFERENTIAL - Abnormal; Notable for the following components:    Platelets 80 (*)     All other components within normal limits   LACTIC ACID, PLASMA - Normal   RAINBOW DRAW    Narrative:     The following orders were created for panel order [place] Draw.  Procedure                               Abnormality         Status                     ---------                               -----------         ------                     Green Top (Gel)[[id number]]                                  Final result               Lavender Top[[id number]]                                     Final result               Gold Top - SST[[id number]]                                   Final result               Light Blue Top[[id number]]                                   Final result               Green Top (Gel)[[id number]]                                                               Please view results for these tests on the individual orders.   URINALYSIS, MICROSCOPIC ONLY   RAINBOW URINE CULTURE TUBE   CBC AND DIFFERENTIAL    Narrative:     The following orders were created for panel order CBC &  Differential.  Procedure                               Abnormality         Status                     ---------                               -----------         ------                     CBC Auto Differential[[id number]]        Abnormal            Final result                 Please view results for these tests on the individual orders.   GREEN TOP   LAVENDER TOP   GOLD TOP - SST   LIGHT BLUE TOP       EKG:   No orders to display       Meds given in ED:   Medications   sodium chloride 0.9 % flush 10 mL (has no administration in time range)   ondansetron (ZOFRAN) injection 4 mg (4 mg Intravenous Given 7/23/25 2040)   sodium chloride 0.9 % bolus 1,000 mL (0 mL Intravenous Stopped 7/23/25 2140)   iopamidol (ISOVUE-370) 76 % injection 100 mL (85 mL Intravenous Given 7/23/25 2037)       Imaging results:  XR Chest 2 View  Result Date: 7/24/2025  Mild bibasilar atelectasis.  This report was finalized on 7/24/2025 12:05 AM by Dr. Guillermina Landis M.D on Workstation: [facility]      CT Abdomen Pelvis With Contrast  Result Date: 7/23/2025   1. Cirrhosis with evidence of portal hypertension. No obvious acute findings identified within the abdomen or pelvis. 2. Nonocclusive thrombus noted within the main portal vein.  Radiation dose reduction techniques were utilized, including automated exposure control and exposure modulation based on body size.   This report was finalized on 7/23/2025 9:08 PM by Dr. Guillermina Landis M.D on Workstation: [facility]        Ambulatory status:   - ad elaine    Social issues:   Social History     Socioeconomic History    Marital status:    Tobacco Use    Smoking status: Never    Smokeless tobacco: Never   Substance and Sexual Activity    Alcohol use: No    Drug use: No       Peripheral Neurovascular  Peripheral Neurovascular (Adult)  Peripheral Neurovascular WDL: WDL    Neuro Cognitive  Neuro Cognitive (Adult)  Cognitive/Neuro/Behavioral WDL: WDL    Learning  Learning  Assessment  Learning Readiness and Ability: no barriers identified  Education Provided  Person Taught: patient, spouse    Respiratory  Respiratory WDL  Respiratory WDL: WDL    Abdominal Pain       Pain Assessments  Pain (Adult)  (0-10) Pain Rating: Rest: 6  (0-10) Pain Rating: Activity: 6  Pain Location: abdomen    NIH Stroke Scale       Stella Johnson RN  07/24/25 03:47 EDT
